# Patient Record
Sex: FEMALE | Race: AMERICAN INDIAN OR ALASKA NATIVE | NOT HISPANIC OR LATINO | URBAN - METROPOLITAN AREA
[De-identification: names, ages, dates, MRNs, and addresses within clinical notes are randomized per-mention and may not be internally consistent; named-entity substitution may affect disease eponyms.]

---

## 2019-07-12 ENCOUNTER — EMERGENCY (EMERGENCY)
Facility: HOSPITAL | Age: 49
LOS: 1 days | Discharge: HOME | End: 2019-07-12
Attending: EMERGENCY MEDICINE | Admitting: EMERGENCY MEDICINE
Payer: SELF-PAY

## 2019-07-12 VITALS
SYSTOLIC BLOOD PRESSURE: 137 MMHG | HEART RATE: 66 BPM | RESPIRATION RATE: 18 BRPM | DIASTOLIC BLOOD PRESSURE: 74 MMHG | OXYGEN SATURATION: 100 % | TEMPERATURE: 98 F

## 2019-07-12 VITALS
RESPIRATION RATE: 18 BRPM | DIASTOLIC BLOOD PRESSURE: 62 MMHG | TEMPERATURE: 99 F | OXYGEN SATURATION: 99 % | HEART RATE: 82 BPM | SYSTOLIC BLOOD PRESSURE: 119 MMHG

## 2019-07-12 LAB
ALBUMIN SERPL ELPH-MCNC: 4.4 G/DL — SIGNIFICANT CHANGE UP (ref 3.3–5)
ALP SERPL-CCNC: 86 U/L — SIGNIFICANT CHANGE UP (ref 40–120)
ALT FLD-CCNC: 25 U/L — SIGNIFICANT CHANGE UP (ref 4–33)
ANION GAP SERPL CALC-SCNC: 10 MMO/L — SIGNIFICANT CHANGE UP (ref 7–14)
AST SERPL-CCNC: 22 U/L — SIGNIFICANT CHANGE UP (ref 4–32)
BASOPHILS # BLD AUTO: 0.04 K/UL — SIGNIFICANT CHANGE UP (ref 0–0.2)
BASOPHILS NFR BLD AUTO: 0.6 % — SIGNIFICANT CHANGE UP (ref 0–2)
BILIRUB SERPL-MCNC: 0.5 MG/DL — SIGNIFICANT CHANGE UP (ref 0.2–1.2)
BUN SERPL-MCNC: 10 MG/DL — SIGNIFICANT CHANGE UP (ref 7–23)
CALCIUM SERPL-MCNC: 10.1 MG/DL — SIGNIFICANT CHANGE UP (ref 8.4–10.5)
CHLORIDE SERPL-SCNC: 102 MMOL/L — SIGNIFICANT CHANGE UP (ref 98–107)
CO2 SERPL-SCNC: 26 MMOL/L — SIGNIFICANT CHANGE UP (ref 22–31)
CREAT SERPL-MCNC: 0.4 MG/DL — LOW (ref 0.5–1.3)
EOSINOPHIL # BLD AUTO: 0.06 K/UL — SIGNIFICANT CHANGE UP (ref 0–0.5)
EOSINOPHIL NFR BLD AUTO: 0.9 % — SIGNIFICANT CHANGE UP (ref 0–6)
GLUCOSE SERPL-MCNC: 78 MG/DL — SIGNIFICANT CHANGE UP (ref 70–99)
HCT VFR BLD CALC: 43.1 % — SIGNIFICANT CHANGE UP (ref 34.5–45)
HGB BLD-MCNC: 13.8 G/DL — SIGNIFICANT CHANGE UP (ref 11.5–15.5)
IMM GRANULOCYTES NFR BLD AUTO: 0.2 % — SIGNIFICANT CHANGE UP (ref 0–1.5)
LYMPHOCYTES # BLD AUTO: 3.21 K/UL — SIGNIFICANT CHANGE UP (ref 1–3.3)
LYMPHOCYTES # BLD AUTO: 49 % — HIGH (ref 13–44)
MAGNESIUM SERPL-MCNC: 2.2 MG/DL — SIGNIFICANT CHANGE UP (ref 1.6–2.6)
MCHC RBC-ENTMCNC: 29.2 PG — SIGNIFICANT CHANGE UP (ref 27–34)
MCHC RBC-ENTMCNC: 32 % — SIGNIFICANT CHANGE UP (ref 32–36)
MCV RBC AUTO: 91.1 FL — SIGNIFICANT CHANGE UP (ref 80–100)
MONOCYTES # BLD AUTO: 0.35 K/UL — SIGNIFICANT CHANGE UP (ref 0–0.9)
MONOCYTES NFR BLD AUTO: 5.3 % — SIGNIFICANT CHANGE UP (ref 2–14)
NEUTROPHILS # BLD AUTO: 2.88 K/UL — SIGNIFICANT CHANGE UP (ref 1.8–7.4)
NEUTROPHILS NFR BLD AUTO: 44 % — SIGNIFICANT CHANGE UP (ref 43–77)
NRBC # FLD: 0 K/UL — SIGNIFICANT CHANGE UP (ref 0–0)
PHOSPHATE SERPL-MCNC: 3.8 MG/DL — SIGNIFICANT CHANGE UP (ref 2.5–4.5)
PLATELET # BLD AUTO: 287 K/UL — SIGNIFICANT CHANGE UP (ref 150–400)
PMV BLD: 9.3 FL — SIGNIFICANT CHANGE UP (ref 7–13)
POTASSIUM SERPL-MCNC: 4 MMOL/L — SIGNIFICANT CHANGE UP (ref 3.5–5.3)
POTASSIUM SERPL-SCNC: 4 MMOL/L — SIGNIFICANT CHANGE UP (ref 3.5–5.3)
PROT SERPL-MCNC: 7.7 G/DL — SIGNIFICANT CHANGE UP (ref 6–8.3)
RBC # BLD: 4.73 M/UL — SIGNIFICANT CHANGE UP (ref 3.8–5.2)
RBC # FLD: 13 % — SIGNIFICANT CHANGE UP (ref 10.3–14.5)
SODIUM SERPL-SCNC: 138 MMOL/L — SIGNIFICANT CHANGE UP (ref 135–145)
TROPONIN T, HIGH SENSITIVITY: < 6 NG/L — SIGNIFICANT CHANGE UP (ref ?–14)
WBC # BLD: 6.55 K/UL — SIGNIFICANT CHANGE UP (ref 3.8–10.5)
WBC # FLD AUTO: 6.55 K/UL — SIGNIFICANT CHANGE UP (ref 3.8–10.5)

## 2019-07-12 PROCEDURE — 99284 EMERGENCY DEPT VISIT MOD MDM: CPT

## 2019-07-12 PROCEDURE — 70450 CT HEAD/BRAIN W/O DYE: CPT | Mod: 26

## 2019-07-12 NOTE — CONSULT NOTE ADULT - ASSESSMENT
50yo  woman with HLD (not on medications), presents with complaints of left facial numbness that began insidiously at 930am, worsening over the next hour, before starting to improve. Patient reports numbness over cheek and lower jaw. Currently states numbness is currently 5% of the worst it has been. Of note, patient reports LUE numbness that is intermittent. Numbness, when present, is over L. arm and forearm, radiating to fingers. Patient currently denies fevers, chills, ns, cp, sob, abd pain, n/v/d/c, weakness, or changes to weight or senses.   In the ED, VSS, labs grossly WNL, CTH pending.     Patient currently asymptomatic.   Insidious onset of symptoms with slow resolution does not support ischemic etiology.     Impression:    Peripheral neuropathy possibly 2/2 cervical stenosis.     Plan:  - [facility]   - Outpatient MRI Head, Cervical Spine.   - f/u outpatient w/ Neurology @ 611 ([phone])

## 2019-07-12 NOTE — ED PROVIDER NOTE - OBJECTIVE STATEMENT
49F w/ pmh 49F w/out pmh - p/w L face numbness since 930am today, acute onset then slowly improved, still has mild numbness now. Has had 10 days L arm numbness as well. No recent travel, medication change, illness, or hospitalization. No fever, n/v/d/c, chest / abd pain, cough, dizziness, dysuria/hematuria. Never felt like this before.

## 2019-07-12 NOTE — ED PROVIDER NOTE - NSFOLLOWUPCLINICS_GEN_ALL_ED_FT
[facility]  Neurology  [address] [place] [address] [address] [place] [address]  Phone: [phone]  Fax:   Follow Up Time:

## 2019-07-12 NOTE — ED PROVIDER NOTE - PHYSICAL EXAMINATION
*GEN:   comfortable, in no acute distress, AOx3  *EYES:   pupils equally round and reactive to light, extra-occular movements intact  *HEENT:   airway patent, moist mucosal membranes  *CV:   regular rate and rhythm  *RESP:   clear to auscultation bilaterally, non-labored  *ABD:   soft, non-tender  *:   no cva/flank tenderness  *EXTREM:   no MSK tenderness, full ROM throughout, no leg swelling  *SKIN:   dry, intact  *NEURO:   AOx3, cranial nerves intact throughout, strength 5/5, no focal loss of sensation, no pronator drift, finger/nose normal, ambulating w/ normal gait

## 2019-07-12 NOTE — CONSULT NOTE ADULT - SUBJECTIVE AND OBJECTIVE BOX
Neurology  Consult Note  07-12-19    Name:  NEHAL CASTANEDA; 49y (1970)    Reason for Admission:     Chief Complaint:  L face numbness.     HPI:  48yo  woman with HLD (not on medications), presents with complaints of left facial numbness that began insidiously at 930am, worsening over the next hour, before starting to improve. Patient reports numbness over cheek and lower jaw. Currently states numbness is currently 5% of the worst it has been. Of note, patient reports LUE numbness that is intermittent. Numbness, when present, is over L. arm and forearm, radiating to fingers. 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 Patient currently denies fevers, chills, ns, cp, sob, abd pain, n/v/d/c, weakness, or changes to weight or senses.   In the ED, VSS, labs grossly WNL, CTH pending.     Review of Systems:  As states in HPI.    Vitals:  T(C): 36.9 (07-12-19 @ 14:24), Max: 36.9 (07-12-19 @ 14:24)  HR: 70 (07-12-19 @ 14:24) (66 - 70)  BP: 150/77 (07-12-19 @ 14:24) (137/74 - 150/77)  RR: 16 (07-12-19 @ 14:24) (16 - 18)  SpO2: 99% (07-12-19 @ 14:24) (99% - 100%)    Labs:                        13.8   6.55  )-----------( 287      ( 12 Jul 2019 13:25 )             43.1     07-12    138  |  102  |  10  ----------------------------<  78  4.0   |  26  |  0.40<L>    Ca    10.1      12 Jul 2019 13:25  Phos  3.8     07-12  Mg     2.2     07-12    TPro  7.7  /  Alb  4.4  /  TBili  0.5  /  DBili  x   /  AST  22  /  ALT  25  /  AlkPhos  86  07-12    CAPILLARY BLOOD GLUCOSE    LIVER FUNCTIONS - ( 12 Jul 2019 13:25 )  Alb: 4.4 g/dL / Pro: 7.7 g/dL / ALK PHOS: 86 u/L / ALT: 25 u/L / AST: 22 u/L / GGT: x           PHYSICAL EXAMINATION:  General: Well-developed, well nourished, in no acute distress.  Eyes: Conjunctiva and sclera clear.  Neurologic:  - Mental Status:  Alert, awake, oriented to person, place, and time; Speech is fluent with intact naming, repetition, and comprehension; Good overall fund of knowledge  - Cranial Nerves II-XII:    II:  Visual fields are full to confrontation; Pupils are equal, round, and reactive to light  III, IV, VI:  Extraocular movements are intact without nystagmus.  V:  Facial sensation is intact in the V1-V3 distribution bilaterally.  VII:  Face is symmetric with normal eye closure and smile  VIII:  Hearing is intact to finger rub.  IX, X:  Uvula is midline and soft palate rises symmetrically  XI:  Head turning and shoulder shrug are intact.  XII:  Tongue protudes in the midline.  - Motor:  Strength is 5/5 throughout.  There is no pronator drift.  Normal muscle bulk and tone throughout.  - Reflexes:  2+ and symmetric at the biceps, triceps, brachioradialis, knees, and ankles.  Plantar responses muted.  - Sensory:  Intact throughout to vibration, and joint-position, light touch, pin prick.  - Coordination:  Finger-nose-finger and heel-knee-shin intact without dysmetria.  Rapid alternating hand movements intact.  - Gait:   Normal steps, base, arm swing, and turning.  Heel and toe walking are normal.  Tandem gait is normal.  Romberg testing is negative.    Radiology:  [facility] Pending.

## 2019-07-12 NOTE — ED PROVIDER NOTE - ATTENDING CONTRIBUTION TO CARE
49F w/out pmh - p/w L face numbness since 930am today, acute onset then slowly improved, still has mild numbness now. Has had 10 days L arm numbness as well. No recent travel, medication change, illness, or hospitalization. No fever, n/v/d/c, chest / abd pain, cough, dizziness, dysuria/hematuria. Never felt like this before. On exam: VSS lungs, heart, pulses, abdomen, neuro, extremities, skin, all normal on exam. IMP: L facial and L arm numbness as noted. Plan: labs EKG CT head, will d/w neuro

## 2019-07-12 NOTE — ED ADULT NURSE NOTE - OBJECTIVE STATEMENT
lft arm numbness, lft facial numbness and fatigue x1 week, denies   Patient to room 25 with family ay bedside. Pt evaluated by MD. IVL placed to right AC 30 gauge and labs drawn and sent. Pt waiting for results, further test (CT Scan) and evaluation.   KINJAL Adams

## 2019-07-12 NOTE — ED PROVIDER NOTE - PROGRESS NOTE DETAILS
J Luis Bourgeois PGY2: consulted neuro J Luis Bourgeois PGY2: sx improved ct wnl will instruct to f/u w/ neuro clinic

## 2022-03-21 ENCOUNTER — EMERGENCY (EMERGENCY)
Facility: HOSPITAL | Age: 52
LOS: 0 days | Discharge: HOME | End: 2022-03-21
Attending: EMERGENCY MEDICINE | Admitting: EMERGENCY MEDICINE
Payer: COMMERCIAL

## 2022-03-21 VITALS
TEMPERATURE: 97 F | OXYGEN SATURATION: 100 % | HEIGHT: 59 IN | RESPIRATION RATE: 20 BRPM | WEIGHT: 126.99 LBS | HEART RATE: 72 BPM | DIASTOLIC BLOOD PRESSURE: 69 MMHG | SYSTOLIC BLOOD PRESSURE: 162 MMHG

## 2022-03-21 DIAGNOSIS — R11.0 NAUSEA: ICD-10-CM

## 2022-03-21 DIAGNOSIS — I44.7 LEFT BUNDLE-BRANCH BLOCK, UNSPECIFIED: ICD-10-CM

## 2022-03-21 DIAGNOSIS — H93.19 TINNITUS, UNSPECIFIED EAR: ICD-10-CM

## 2022-03-21 DIAGNOSIS — R42 DIZZINESS AND GIDDINESS: ICD-10-CM

## 2022-03-21 DIAGNOSIS — E78.5 HYPERLIPIDEMIA, UNSPECIFIED: ICD-10-CM

## 2022-03-21 DIAGNOSIS — E78.00 PURE HYPERCHOLESTEROLEMIA, UNSPECIFIED: ICD-10-CM

## 2022-03-21 LAB
ALBUMIN SERPL ELPH-MCNC: 4.6 G/DL — SIGNIFICANT CHANGE UP (ref 3.5–5.2)
ALP SERPL-CCNC: 113 U/L — SIGNIFICANT CHANGE UP (ref 30–115)
ALT FLD-CCNC: 23 U/L — SIGNIFICANT CHANGE UP (ref 0–41)
ANION GAP SERPL CALC-SCNC: 13 MMOL/L — SIGNIFICANT CHANGE UP (ref 7–14)
AST SERPL-CCNC: 32 U/L — SIGNIFICANT CHANGE UP (ref 0–41)
BASOPHILS # BLD AUTO: 0.03 K/UL — SIGNIFICANT CHANGE UP (ref 0–0.2)
BASOPHILS NFR BLD AUTO: 0.4 % — SIGNIFICANT CHANGE UP (ref 0–1)
BILIRUB SERPL-MCNC: 0.3 MG/DL — SIGNIFICANT CHANGE UP (ref 0.2–1.2)
BUN SERPL-MCNC: 12 MG/DL — SIGNIFICANT CHANGE UP (ref 10–20)
CALCIUM SERPL-MCNC: 10.1 MG/DL — SIGNIFICANT CHANGE UP (ref 8.5–10.1)
CHLORIDE SERPL-SCNC: 106 MMOL/L — SIGNIFICANT CHANGE UP (ref 98–110)
CO2 SERPL-SCNC: 24 MMOL/L — SIGNIFICANT CHANGE UP (ref 17–32)
CREAT SERPL-MCNC: <0.5 MG/DL — LOW (ref 0.7–1.5)
EGFR: 118 ML/MIN/1.73M2 — SIGNIFICANT CHANGE UP
EOSINOPHIL # BLD AUTO: 0.04 K/UL — SIGNIFICANT CHANGE UP (ref 0–0.7)
EOSINOPHIL NFR BLD AUTO: 0.5 % — SIGNIFICANT CHANGE UP (ref 0–8)
GLUCOSE SERPL-MCNC: 108 MG/DL — HIGH (ref 70–99)
HCT VFR BLD CALC: 44.9 % — SIGNIFICANT CHANGE UP (ref 37–47)
HGB BLD-MCNC: 14.4 G/DL — SIGNIFICANT CHANGE UP (ref 12–16)
IMM GRANULOCYTES NFR BLD AUTO: 0.1 % — SIGNIFICANT CHANGE UP (ref 0.1–0.3)
LYMPHOCYTES # BLD AUTO: 2.3 K/UL — SIGNIFICANT CHANGE UP (ref 1.2–3.4)
LYMPHOCYTES # BLD AUTO: 30.6 % — SIGNIFICANT CHANGE UP (ref 20.5–51.1)
MCHC RBC-ENTMCNC: 28.7 PG — SIGNIFICANT CHANGE UP (ref 27–31)
MCHC RBC-ENTMCNC: 32.1 G/DL — SIGNIFICANT CHANGE UP (ref 32–37)
MCV RBC AUTO: 89.6 FL — SIGNIFICANT CHANGE UP (ref 81–99)
MONOCYTES # BLD AUTO: 0.25 K/UL — SIGNIFICANT CHANGE UP (ref 0.1–0.6)
MONOCYTES NFR BLD AUTO: 3.3 % — SIGNIFICANT CHANGE UP (ref 1.7–9.3)
NEUTROPHILS # BLD AUTO: 4.88 K/UL — SIGNIFICANT CHANGE UP (ref 1.4–6.5)
NEUTROPHILS NFR BLD AUTO: 65.1 % — SIGNIFICANT CHANGE UP (ref 42.2–75.2)
NRBC # BLD: 0 /100 WBCS — SIGNIFICANT CHANGE UP (ref 0–0)
PLATELET # BLD AUTO: 266 K/UL — SIGNIFICANT CHANGE UP (ref 130–400)
POTASSIUM SERPL-MCNC: 5.9 MMOL/L — HIGH (ref 3.5–5)
POTASSIUM SERPL-SCNC: 5.9 MMOL/L — HIGH (ref 3.5–5)
PROT SERPL-MCNC: 7.7 G/DL — SIGNIFICANT CHANGE UP (ref 6–8)
RBC # BLD: 5.01 M/UL — SIGNIFICANT CHANGE UP (ref 4.2–5.4)
RBC # FLD: 12.5 % — SIGNIFICANT CHANGE UP (ref 11.5–14.5)
SODIUM SERPL-SCNC: 143 MMOL/L — SIGNIFICANT CHANGE UP (ref 135–146)
TROPONIN T SERPL-MCNC: <0.01 NG/ML — SIGNIFICANT CHANGE UP
WBC # BLD: 7.51 K/UL — SIGNIFICANT CHANGE UP (ref 4.8–10.8)
WBC # FLD AUTO: 7.51 K/UL — SIGNIFICANT CHANGE UP (ref 4.8–10.8)

## 2022-03-21 PROCEDURE — 70450 CT HEAD/BRAIN W/O DYE: CPT | Mod: 26,MA

## 2022-03-21 PROCEDURE — 99285 EMERGENCY DEPT VISIT HI MDM: CPT

## 2022-03-21 PROCEDURE — 99053 MED SERV 10PM-8AM 24 HR FAC: CPT

## 2022-03-21 PROCEDURE — 93010 ELECTROCARDIOGRAM REPORT: CPT

## 2022-03-21 RX ORDER — MECLIZINE HCL 12.5 MG
1 TABLET ORAL
Qty: 21 | Refills: 0
Start: 2022-03-21 | End: 2022-03-27

## 2022-03-21 RX ORDER — SODIUM CHLORIDE 9 MG/ML
1000 INJECTION INTRAMUSCULAR; INTRAVENOUS; SUBCUTANEOUS ONCE
Refills: 0 | Status: COMPLETED | OUTPATIENT
Start: 2022-03-21 | End: 2022-03-21

## 2022-03-21 RX ORDER — MECLIZINE HCL 12.5 MG
25 TABLET ORAL ONCE
Refills: 0 | Status: COMPLETED | OUTPATIENT
Start: 2022-03-21 | End: 2022-03-21

## 2022-03-21 RX ORDER — ATORVASTATIN CALCIUM 80 MG/1
0 TABLET, FILM COATED ORAL
Qty: 0 | Refills: 0 | DISCHARGE

## 2022-03-21 RX ORDER — ONDANSETRON 8 MG/1
4 TABLET, FILM COATED ORAL ONCE
Refills: 0 | Status: COMPLETED | OUTPATIENT
Start: 2022-03-21 | End: 2022-03-21

## 2022-03-21 RX ADMIN — SODIUM CHLORIDE 1000 MILLILITER(S): 9 INJECTION INTRAMUSCULAR; INTRAVENOUS; SUBCUTANEOUS at 07:13

## 2022-03-21 RX ADMIN — Medication 25 MILLIGRAM(S): at 07:12

## 2022-03-21 RX ADMIN — ONDANSETRON 4 MILLIGRAM(S): 8 TABLET, FILM COATED ORAL at 07:13

## 2022-03-21 NOTE — ED PROVIDER NOTE - OBJECTIVE STATEMENT
50 yo female hx of HLD BIBA from home 2/2 dizziness with nausea started around 430 am when she woke up to use the bathroom. she went to bed around 830 last night. reported it was spinning sensation. position change worsen her symptoms and improved while remaining still. sxs completedly resolved prior to ED arrival. denies similar sxs in the past. Denies HA/slur speech/extremities weakness and numbness/ facial numbness and weakness. Denies Fever/chill/recent illness/coughing/chest pain/sob/abd pain/n/v/d/extremities pain/urinary sxs.

## 2022-03-21 NOTE — ED PROVIDER NOTE - PROGRESS NOTE DETAILS
Signed out Dr. Garrett. WF: pt reassessed, vertigo sudden onset while waking up and standing up, tinnutus the previous day. currently resolved, non-focal exam. pending ct head. WF: pt reassessed, vertigo sudden onset while waking up and standing up, tinnitus the previous day. currently resolved, non-focal exam. pending ct head. WF: ct neg for acute findings, vertigo improved. no ataxia, neuro exam non-focal. d/c with meclizine, f/u with ent.

## 2022-03-21 NOTE — ED PROVIDER NOTE - PHYSICAL EXAMINATION
CONSTITUTIONAL: Well-appearing; well-nourished; in no apparent distress.   EYES: PERRL; EOM intact.   CARDIOVASCULAR: Normal S1, S2; no murmurs, rubs, or gallops.   RESPIRATORY: Normal chest excursion with respiration; breath sounds clear and equal bilaterally; no wheezes, rhonchi, or rales.  GI/: Normal bowel sounds; non-distended; non-tender; no palpable organomegaly.   MS: No calf swelling and tenderness.  SKIN: Normal for age and race; warm; dry; good turgor; no apparent lesions or exudate.   NEURO/PSYCH: A & O x 4; CN II-XII grossly unremarkable. no drifting. strength equal to b/l upper and lower extremities. speaking coherently. nml cerebellum test.

## 2022-03-21 NOTE — ED PROVIDER NOTE - NS ED ATTENDING STATEMENT MOD
This was a shared visit with the DEANGELO. I reviewed and verified the documentation and independently performed the documented:

## 2022-03-21 NOTE — ED PROVIDER NOTE - NSFOLLOWUPINSTRUCTIONS_ED_ALL_ED_FT
Vertigo    Vertigo is the feeling that you or your surroundings are moving when they are not. Vertigo can be dangerous if it occurs while you are doing something that could endanger you or others, such as driving.    CAUSES  This condition is caused by a disturbance in the signals that are sent by your body's sensory systems to your brain. Different causes of a disturbance can lead to vertigo, including:    Infections, especially in the inner ear.  A bad reaction to a drug, or misuse of alcohol and medicines.  Withdrawal from drugs or alcohol.  Quickly changing positions, as when lying down or rolling over in bed.  Migraine headaches.  Decreased blood flow to the brain.  Decreased blood pressure.  Increased pressure in the brain from a head or neck injury, stroke, infection, tumor, or bleeding.  Central nervous system disorders.    SYMPTOMS  Symptoms of this condition usually occur when you move your head or your eyes in different directions. Symptoms may start suddenly, and they usually last for less than a minute. Symptoms may include:    Loss of balance and falling.  Feeling like you are spinning or moving.  Feeling like your surroundings are spinning or moving.  Nausea and vomiting.  Blurred vision or double vision.  Difficulty hearing.  Slurred speech.  Dizziness.  Involuntary eye movement (nystagmus).    Symptoms can be mild and cause only slight annoyance, or they can be severe and interfere with daily life. Episodes of vertigo may return (recur) over time, and they are often triggered by certain movements. Symptoms may improve over time.    DIAGNOSIS  This condition may be diagnosed based on medical history and the quality of your nystagmus. Your health care provider may test your eye movements by asking you to quickly change positions to trigger the nystagmus. This may be called the [place]-Hallpike test, head thrust test, or roll test. You may be referred to a health care provider who specializes in ear, nose, and throat (ENT) problems (otolaryngologist) or a provider who specializes in disorders of the central nervous system (neurologist).    You may have additional testing, including:    A physical exam.  Blood tests.  MRI.  A CT scan.  An electrocardiogram (ECG). This records electrical activity in your heart.  An electroencephalogram (EEG). This records electrical activity in your brain.  Hearing tests.    TREATMENT  Treatment for this condition depends on the cause and the severity of the symptoms. Treatment options include:    Medicines to treat nausea or vertigo. These are usually used for severe cases. Some medicines that are used to treat other conditions may also reduce or eliminate vertigo symptoms. These include:  Medicines that control allergies (antihistamines).  Medicines that control seizures (anticonvulsants).  Medicines that relieve depression (antidepressants).  Medicines that relieve anxiety (sedatives).  Head movements to adjust your inner ear back to normal. If your vertigo is caused by an ear problem, your health care provider may recommend certain movements to correct the problem.  Surgery. This is rare.    HOME CARE INSTRUCTIONS  Safety     Move slowly. Avoid sudden body or head movements.  Avoid driving.  Avoid operating heavy machinery.  Avoid doing any tasks that would cause danger to you or others if you would have a vertigo episode during the task.  If you have trouble walking or keeping your balance, try using a cane for stability. If you feel dizzy or unstable, sit down right away.  Return to your normal activities as told by your health care provider. Ask your health care provider what activities are safe for you.    General Instructions     Take over-the-counter and prescription medicines only as told by your health care provider.  Avoid certain positions or movements as told by your health care provider.  Drink enough fluid to keep your urine clear or pale yellow.  Keep all follow-up visits as told by your health care provider. This is important.    SEEK MEDICAL CARE IF:  Your medicines do not relieve your vertigo or they make it worse.  You have a fever.  Your condition gets worse or you develop new symptoms.  Your family or friends notice any behavioral changes.  Your nausea or vomiting gets worse.  You have numbness or a "pins and needles" sensation in part of your body.    SEEK IMMEDIATE MEDICAL CARE IF:  You have difficulty moving or speaking.  You are always dizzy.  You faint.  You develop severe headaches.  You have weakness in your hands, arms, or legs.  You have changes in your hearing or vision.  You develop a stiff neck.  You develop sensitivity to light.    ADDITIONAL NOTES AND INSTRUCTIONS    Please follow up with your Primary MD in 24-48 hr.  Seek immediate medical care for any new/worsening signs or symptoms.

## 2022-03-21 NOTE — ED PROVIDER NOTE - ATTENDING CONTRIBUTION TO CARE
I personally evaluated the patient. I reviewed the Resident´s or Physician Assistant´s note (as assigned above), and agree with the findings and plan except as documented in my note.  51-year-old female, H/O HLD, woke up with dizziness described as room spinning around 2 hours prior to arrival.  No slurred speech, diplopia or gait disturbance.  Denies chest pain or shortness of breath.  Exam shows alert patient in no distress, HEENT NCAT PERRL, neck supple, lungs clear, RR S1S2, abdomen soft NT +BS, no CCE, skin no rash, neuro A&OX3 GCS 15 no deficits.

## 2022-03-21 NOTE — ED PROVIDER NOTE - CLINICAL SUMMARY MEDICAL DECISION MAKING FREE TEXT BOX
fs: patient presents for evaluation of dizziness onset prior to sleeping, in addition patient also reports tinnitus more than  24 hours prior to evaluation we obtained ekg labs and ct scan which is negative patient was observed and treated now asymptomatic.  her presentation and exam is most consistent with peripheral cause of dizziness however, based on time of onset of symptoms, patient is not tpa as symptom onset more than 24 hours in addition not a candidate for thrombectomy as well secondary to time of onset I will discharge and have discussed indications to return at this time

## 2022-03-21 NOTE — ED PROVIDER NOTE - CARE PROVIDER_API CALL
Magno Garcia)  Otolaryngology  [address] [place] [address], [address] [place] [address]  Phone: [phone]  Fax: [phone]  Follow Up Time: 1-3 Days

## 2022-03-21 NOTE — ED PROVIDER NOTE - PATIENT PORTAL LINK FT
You can access the FollowMyHealth Patient Portal offered by [facility] by registering at the following website: [email or web address]. By joining [facility]’s FollowMyHealth portal, you will also be able to view your health information using other applications (apps) compatible with our system.

## 2022-03-28 ENCOUNTER — APPOINTMENT (OUTPATIENT)
Dept: OTOLARYNGOLOGY | Facility: CLINIC | Age: 52
End: 2022-03-28
Payer: COMMERCIAL

## 2022-03-28 VITALS — BODY MASS INDEX: 27.18 KG/M2 | HEIGHT: 57 IN | WEIGHT: 126 LBS

## 2022-03-28 DIAGNOSIS — R42 DIZZINESS AND GIDDINESS: ICD-10-CM

## 2022-03-28 PROBLEM — Z00.00 ENCOUNTER FOR PREVENTIVE HEALTH EXAMINATION: Status: ACTIVE | Noted: 2022-03-28

## 2022-03-28 PROCEDURE — 99203 OFFICE O/P NEW LOW 30 MIN: CPT | Mod: 25

## 2022-03-28 PROCEDURE — 92550 TYMPANOMETRY & REFLEX THRESH: CPT

## 2022-03-28 PROCEDURE — 92557 COMPREHENSIVE HEARING TEST: CPT

## 2022-03-28 PROCEDURE — 99072 ADDL SUPL MATRL&STAF TM PHE: CPT

## 2022-03-28 NOTE — HISTORY OF PRESENT ILLNESS
[de-identified] : Patient presents today  dizziness .  She  had  vertigo  , last  week .  Has been using  meclizine .  Some symptoms  since last week , but  had  much improvement .  Denies any ear pressure .  Noticed that she  did  have  some  loud   sounds in ear prior.   No History  of  vertigo prior .  Room spinning  .  Was  seen in ED. Scans are reportedly negative.
